# Patient Record
Sex: FEMALE | Race: WHITE | NOT HISPANIC OR LATINO | URBAN - METROPOLITAN AREA
[De-identification: names, ages, dates, MRNs, and addresses within clinical notes are randomized per-mention and may not be internally consistent; named-entity substitution may affect disease eponyms.]

---

## 2023-05-17 RX ORDER — SODIUM CHLORIDE 9 MG/ML
1000 INJECTION, SOLUTION INTRAVENOUS
Refills: 0 | Status: DISCONTINUED | OUTPATIENT
Start: 2023-05-18 | End: 2023-05-18

## 2023-05-17 NOTE — ASU PATIENT PROFILE, ADULT - NSICDXPASTSURGICALHX_GEN_ALL_CORE_FT
PAST SURGICAL HISTORY:  H/O: hysterectomy     History of surgery left shoulder x4 due to accident

## 2023-05-17 NOTE — ASU PATIENT PROFILE, ADULT - FALL HARM RISK - RISK INTERVENTIONS

## 2023-05-18 ENCOUNTER — OUTPATIENT (OUTPATIENT)
Dept: OUTPATIENT SERVICES | Facility: HOSPITAL | Age: 68
LOS: 1 days | Discharge: HOME | End: 2023-05-18

## 2023-05-18 VITALS
DIASTOLIC BLOOD PRESSURE: 70 MMHG | RESPIRATION RATE: 16 BRPM | HEART RATE: 50 BPM | OXYGEN SATURATION: 97 % | SYSTOLIC BLOOD PRESSURE: 119 MMHG | TEMPERATURE: 98 F

## 2023-05-18 VITALS
RESPIRATION RATE: 14 BRPM | HEIGHT: 69 IN | DIASTOLIC BLOOD PRESSURE: 79 MMHG | HEART RATE: 63 BPM | OXYGEN SATURATION: 98 % | SYSTOLIC BLOOD PRESSURE: 127 MMHG | WEIGHT: 165.79 LBS | TEMPERATURE: 98 F

## 2023-05-18 DIAGNOSIS — Z90.710 ACQUIRED ABSENCE OF BOTH CERVIX AND UTERUS: Chronic | ICD-10-CM

## 2023-05-18 DIAGNOSIS — Z98.890 OTHER SPECIFIED POSTPROCEDURAL STATES: Chronic | ICD-10-CM

## 2023-05-18 DEVICE — IMPLANTABLE DEVICE
Type: IMPLANTABLE DEVICE | Site: RIGHT | Status: NON-FUNCTIONAL
Removed: 2023-05-18

## 2023-05-18 RX ORDER — CYCLOPENTOLATE HYDROCHLORIDE 10 MG/ML
1 SOLUTION/ DROPS OPHTHALMIC
Refills: 0 | Status: COMPLETED | OUTPATIENT
Start: 2023-05-18 | End: 2023-05-18

## 2023-05-18 RX ORDER — FENTANYL CITRATE 50 UG/ML
25 INJECTION INTRAVENOUS
Refills: 0 | Status: DISCONTINUED | OUTPATIENT
Start: 2023-05-18 | End: 2023-05-18

## 2023-05-18 RX ORDER — OFLOXACIN 0.3 %
1 DROPS OPHTHALMIC (EYE)
Refills: 0 | Status: COMPLETED | OUTPATIENT
Start: 2023-05-18 | End: 2023-05-18

## 2023-05-18 RX ORDER — TROPICAMIDE 1 %
1 DROPS OPHTHALMIC (EYE)
Refills: 0 | Status: COMPLETED | OUTPATIENT
Start: 2023-05-18 | End: 2023-05-18

## 2023-05-18 RX ORDER — KETOROLAC TROMETHAMINE 0.5 %
1 DROPS OPHTHALMIC (EYE)
Refills: 0 | Status: COMPLETED | OUTPATIENT
Start: 2023-05-18 | End: 2023-05-18

## 2023-05-18 RX ORDER — PHENYLEPHRINE HCL 2.5 %
1 DROPS OPHTHALMIC (EYE)
Refills: 0 | Status: COMPLETED | OUTPATIENT
Start: 2023-05-18 | End: 2023-05-18

## 2023-05-18 RX ORDER — SODIUM CHLORIDE 9 MG/ML
500 INJECTION, SOLUTION INTRAVENOUS
Refills: 0 | Status: DISCONTINUED | OUTPATIENT
Start: 2023-05-18 | End: 2023-05-18

## 2023-05-18 RX ADMIN — Medication 1 DROP(S): at 07:20

## 2023-05-18 RX ADMIN — CYCLOPENTOLATE HYDROCHLORIDE 1 DROP(S): 10 SOLUTION/ DROPS OPHTHALMIC at 07:25

## 2023-05-18 RX ADMIN — Medication 1 DROP(S): at 07:15

## 2023-05-18 RX ADMIN — CYCLOPENTOLATE HYDROCHLORIDE 1 DROP(S): 10 SOLUTION/ DROPS OPHTHALMIC at 07:15

## 2023-05-18 RX ADMIN — Medication 1 DROP(S): at 07:25

## 2023-05-18 RX ADMIN — CYCLOPENTOLATE HYDROCHLORIDE 1 DROP(S): 10 SOLUTION/ DROPS OPHTHALMIC at 07:20

## 2023-05-19 NOTE — OPERATIVE REPORT - OPERATIVE RPOSRT DETAILS
PROCEDURE DATE: 05-18-23    OPERATION:  Phacoemulsification with lens implant, RIGHT eye, plus femtolaser plus ORA, RIGHT eye.    PREOPERATIVE DIAGNOSES:  Nuclear sclerotic cataract, astigmatism- right eye    POSTOPERATIVE DIAGNOSES:  Nuclear sclerotic cataract, astigmatism - right eye    PROCEDURE:  After appropriate medical clearance and informed consent was obtained, the patient was brought into the operating room in stable condition.  Prior to placing the patient at the Femto laser, topical anesthetic was placed into the right eye and while the patient was sitting up, a marking pen was used to measure the 3, 6, 9 and 12 o'clock positions of the eye.  The patient was in the prone position for Femto laser.  Suction cup was placed on the eye and docked to the laser.  Capsulorhexis and nuclear fragmentation was performed.  Suction was removed and the Femto procedure was finished.  After this, the patient was brought into the operating room where MAC anesthesia was induced and the patient was prepped and draped in the usual manner for sterile ophthalmic surgery.  A Padmini speculum was placed into the eye and stabilized with two 4x4s.  Topical lidocaine 4% was instilled over the cornea.  The surgeon sat temporally.  At this point, the Toric lens desired axis of positioning was then marked using a marking pen.  A paracentesis was then made and intraocular lidocaine and viscoelastic was inserted into the eye.  Then a biplanar incision was used with a 2.4 mm keratome to make an incision at approximately 3 o'clock to the left eye and approximately 9 o'clock for the right eye.  The anterior capsule was removed and hydrodissection and hydrodelineation of the lens was then performed using BSS on a flat-tipped cannula.  Phacoemulsification of the nucleus was then performed by sculpting the nucleus into half and removing each quadrant after visco was injected.  Phacoemulsifications of the two-halves was performed without complication.    Any remaining cortical material was removed using irrigation and aspiration mechanical technique.  Both the anterior and posterior leaflets of the capsule were cleaned and Healon was inserted in the bag to inflate the anterior and posterior leaflets.  At this time, an ORA was performed to ensure that the desired axis and power of implant was correct.  The Toric lens was then inserted into the right eye and dilated into position at the correct axis.  A Barraquer tonometer was then used to determine that intraocular pressure of the eye was somewhere between 15 to 20 mmHg.  The ORA machine was then used to determine the correct intraocular lens implant, which was confirmed at 21.5 diopters for an [id number] lens.  The ORA machine was turned off and the Implant number [id number], 21.5 diopter was inserted into the bag and rotated into position.  Any remaining Healon was then removed with careful attention to ensure that there was no rotation of the implant and that it remained on axis to correct the patient's astigmatism.    Miochol was then inserted into the eye until good pressure was obtained.  Hydration of both the paracenteses in the wounds were made using BSS and after careful attention to ensuring that the wound closure was made.  At this point, the Padmini speculum was removed and a clear patch was placed over the eye.  The patient returned to the recovery room in stable and improved condition.

## 2023-05-20 PROBLEM — Z78.9 OTHER SPECIFIED HEALTH STATUS: Chronic | Status: ACTIVE | Noted: 2023-05-17

## 2023-05-31 RX ORDER — SODIUM CHLORIDE 9 MG/ML
1000 INJECTION, SOLUTION INTRAVENOUS
Refills: 0 | Status: DISCONTINUED | OUTPATIENT
Start: 2023-06-01 | End: 2023-06-01

## 2023-05-31 RX ORDER — PHENYLEPHRINE HCL 2.5 %
1 DROPS OPHTHALMIC (EYE)
Refills: 0 | Status: DISCONTINUED | OUTPATIENT
Start: 2023-06-01 | End: 2023-06-01

## 2023-05-31 NOTE — ASU PATIENT PROFILE, ADULT - NSICDXPASTSURGICALHX_GEN_ALL_CORE_FT
PAST SURGICAL HISTORY:  H/O: hysterectomy     History of surgery left shoulder x4 due to accident    S/P cataract surgery right

## 2023-05-31 NOTE — ASU PATIENT PROFILE, ADULT - PRO INTERPRETER NEED 2
I reviewed the key components of the Medicare wellness questionnaire with the patient in detail today. He is overdue for eye examination, which she is aware of, however right now he does not have the finances to do this. I have told him to try to get this done as soon as possible. Also reviewed preventative issues. He defers colonoscopy. He is given the stool card. He is reminded of the importance of getting this completed in the near future. I have also told the patient that given his family history (sister with colorectal cancer who  in her 40s) and I would prefer that he have a colonoscopy. Due to the patient's relative immobility, difficulty doing the prep, difficulty getting to and from a colonoscopy still defers colonoscopy at this time.   English

## 2023-05-31 NOTE — ASU PATIENT PROFILE, ADULT - NS PREOP UNDERSTANDS INFO
No solid food/dairy/candy/gum after 9:30pm tonight, water allowed before 04:30, patient to come with photo ID/insurance card, no jewelries/contact lens/valuables, no smoking/alcohol/recreational drug use today, escort to have photo ID, address and call back number was provided./yes

## 2023-05-31 NOTE — ASU PATIENT PROFILE, ADULT - NS PRO ABUSE SCREEN SUSPICION NEGLECT YN
Hx of lupus nephritis on plaquenil  - c/w plaquenil Hx of lupus nephritis on plaquenil  - c/w plaquenil Hx of lupus nephritis on plaquenil  - c/w plaquenil Hx of lupus nephritis on plaquenil  - c/w plaquenil Hx of lupus nephritis on plaquenil  - c/w plaquenil no

## 2023-05-31 NOTE — ASU PATIENT PROFILE, ADULT - FALL HARM RISK - RISK INTERVENTIONS

## 2023-06-01 ENCOUNTER — OUTPATIENT (OUTPATIENT)
Dept: OUTPATIENT SERVICES | Facility: HOSPITAL | Age: 68
LOS: 1 days | Discharge: HOME | End: 2023-06-01

## 2023-06-01 VITALS
RESPIRATION RATE: 12 BRPM | DIASTOLIC BLOOD PRESSURE: 73 MMHG | OXYGEN SATURATION: 98 % | TEMPERATURE: 97 F | SYSTOLIC BLOOD PRESSURE: 110 MMHG | HEART RATE: 60 BPM

## 2023-06-01 VITALS
SYSTOLIC BLOOD PRESSURE: 114 MMHG | HEART RATE: 67 BPM | WEIGHT: 165.35 LBS | OXYGEN SATURATION: 98 % | HEIGHT: 69 IN | TEMPERATURE: 97 F | DIASTOLIC BLOOD PRESSURE: 71 MMHG | RESPIRATION RATE: 16 BRPM

## 2023-06-01 DIAGNOSIS — Z98.890 OTHER SPECIFIED POSTPROCEDURAL STATES: Chronic | ICD-10-CM

## 2023-06-01 DIAGNOSIS — Z90.710 ACQUIRED ABSENCE OF BOTH CERVIX AND UTERUS: Chronic | ICD-10-CM

## 2023-06-01 DIAGNOSIS — Z98.49 CATARACT EXTRACTION STATUS, UNSPECIFIED EYE: Chronic | ICD-10-CM

## 2023-06-01 DEVICE — IMPLANTABLE DEVICE
Type: IMPLANTABLE DEVICE | Status: NON-FUNCTIONAL
Removed: 2023-06-01

## 2023-06-01 RX ORDER — CYCLOPENTOLATE HYDROCHLORIDE 10 MG/ML
1 SOLUTION/ DROPS OPHTHALMIC
Refills: 0 | Status: COMPLETED | OUTPATIENT
Start: 2023-06-01 | End: 2023-06-01

## 2023-06-01 RX ORDER — OFLOXACIN 0.3 %
1 DROPS OPHTHALMIC (EYE)
Refills: 0 | Status: COMPLETED | OUTPATIENT
Start: 2023-06-01 | End: 2023-06-01

## 2023-06-01 RX ORDER — TROPICAMIDE 1 %
1 DROPS OPHTHALMIC (EYE)
Refills: 0 | Status: COMPLETED | OUTPATIENT
Start: 2023-06-01 | End: 2023-06-01

## 2023-06-01 RX ORDER — KETOROLAC TROMETHAMINE 0.5 %
1 DROPS OPHTHALMIC (EYE)
Refills: 0 | Status: COMPLETED | OUTPATIENT
Start: 2023-06-01 | End: 2023-06-01

## 2023-06-01 RX ADMIN — Medication 1 DROP(S): at 07:24

## 2023-06-01 RX ADMIN — CYCLOPENTOLATE HYDROCHLORIDE 1 DROP(S): 10 SOLUTION/ DROPS OPHTHALMIC at 07:11

## 2023-06-01 RX ADMIN — CYCLOPENTOLATE HYDROCHLORIDE 1 DROP(S): 10 SOLUTION/ DROPS OPHTHALMIC at 07:02

## 2023-06-01 RX ADMIN — Medication 1 DROP(S): at 07:23

## 2023-06-01 RX ADMIN — Medication 1 DROP(S): at 07:02

## 2023-06-01 RX ADMIN — Medication 1 DROP(S): at 07:10

## 2023-06-01 RX ADMIN — Medication 1 DROP(S): at 07:11

## 2023-06-01 RX ADMIN — CYCLOPENTOLATE HYDROCHLORIDE 1 DROP(S): 10 SOLUTION/ DROPS OPHTHALMIC at 07:24

## 2023-06-01 RX ADMIN — SODIUM CHLORIDE 40 MILLILITER(S): 9 INJECTION, SOLUTION INTRAVENOUS at 08:36

## 2023-06-01 RX ADMIN — Medication 1 DROP(S): at 07:01

## 2023-06-01 NOTE — PRE-ANESTHESIA EVALUATION ADULT - NSANTHTOBACCOSD_GEN_ALL_CORE
Ob nurse unable to establish IV. Lab called for blood draw fit foot stick.       Everet Kocher, RN  03/21/20 2035 No

## 2023-06-06 NOTE — OPERATIVE REPORT - OPERATIVE RPOSRT DETAILS
PROCEDURE DATE: 06-01-23    OPERATION:  Phacoemulsification with lens implant, LEFT eye, plus femtolaser plus ORA, LEFT eye.    PREOPERATIVE DIAGNOSES:  Nuclear sclerotic cataract, astigmatism- LEFT eye    POSTOPERATIVE DIAGNOSES:  Nuclear sclerotic cataract, astigmatism - LEFT eye    PROCEDURE:  After appropriate medical clearance and informed consent was obtained, the patient was brought into the operating room in stable condition.  Prior to placing the patient at the Femto laser, topical anesthetic was placed into the LEFT eye and while the patient was sitting up, a marking pen was used to measure the 3, 6, 9 and 12 o'clock positions of the eye.  The patient was in the prone position for Femto laser.  Suction cup was placed on the eye and docked to the laser.  Capsulorhexis and nuclear fragmentation was performed.  Suction was removed and the Femto procedure was finished.  After this, the patient was brought into the operating room where MAC anesthesia was induced and the patient was prepped and draped in the usual manner for sterile ophthalmic surgery.  A Padmini speculum was placed into the eye and stabilized with two 4x4s.  Topical lidocaine 4% was instilled over the cornea.  The surgeon sat temporally.  At this point, the Toric lens desired axis of positioning was then marked using a marking pen.  A paracentesis was then made and intraocular lidocaine and viscoelastic was inserted into the eye.  Then a biplanar incision was used with a 2.4 mm keratome to make an incision at approximately 3 o'clock to the left eye and approximately 9 o'clock for the right eye.  The anterior capsule was removed and hydrodissection and hydrodelineation of the lens was then performed using BSS on a flat-tipped cannula.  Phacoemulsification of the nucleus was then performed by sculpting the nucleus into half and removing each quadrant after visco was injected.  Phacoemulsifications of the two-halves was performed without complication.    Any remaining cortical material was removed using irrigation and aspiration mechanical technique.  Both the anterior and posterior leaflets of the capsule were cleaned and Healon was inserted in the bag to inflate the anterior and posterior leaflets.  At this time, an ORA was performed to ensure that the desired axis and power of implant was correct.  The Toric lens was then inserted into the LEFT eye and dilated into position at the correct axis.  A Barraquer tonometer was then used to determine that intraocular pressure of the eye was somewhere between 15 to 20 mmHg.  The ORA machine was then used to determine the correct intraocular lens implant, which was confirmed at 21.5 diopters for an [id number] lens.  The ORA machine was turned off and the Implant number [id number], 21.5 diopter was inserted into the bag and rotated into position.  Any remaining Healon was then removed with careful attention to ensure that there was no rotation of the implant and that it remained on axis to correct the patient's astigmatism.    Miochol was then inserted into the eye until good pressure was obtained.  Hydration of both the paracenteses in the wounds were made using BSS and after careful attention to ensuring that the wound closure was made.  At this point, the Padmini speculum was removed and a clear patch was placed over the eye.  The patient returned to the recovery room in stable and improved condition.

## 2023-07-17 NOTE — ASU PREOP CHECKLIST - WEIGHT IN KG
75.2 [Feeling Poorly] : not feeling poorly (malaise) [Fever] : no fever [Wgt Loss (___ Lbs)] : no recent weight loss [Pallor] : not pale [Eye Discharge] : no eye discharge [Redness] : no redness [Change in Vision] : no change in vision [Nasal Stuffiness] : no nasal congestion [Sore Throat] : no sore throat [Earache] : no earache [Loss Of Hearing] : no hearing loss [Cyanosis] : no cyanosis [Edema] : no edema [Diaphoresis] : not diaphoretic [Chest Pain] : no chest pain or discomfort [Exercise Intolerance] : no persistence of exercise intolerance [Palpitations] : no palpitations [Orthopnea] : no orthopnea [Fast HR] : no tachycardia [Tachypnea] : not tachypneic [Wheezing] : no wheezing [Cough] : no cough [Shortness Of Breath] : not expressed as feeling short of breath [Vomiting] : no vomiting [Diarrhea] : no diarrhea [Abdominal Pain] : no abdominal pain [Decrease In Appetite] : appetite not decreased [Fainting (Syncope)] : no fainting [Seizure] : no seizures [Headache] : no headache [Dizziness] : no dizziness [Limping] : no limping [Joint Pains] : no arthralgias [Joint Swelling] : no joint swelling [Rash] : no rash [Wound problems] : no wound problems [Easy Bruising] : no tendency for easy bruising [Swollen Glands] : no lymphadenopathy [Easy Bleeding] : no ~M tendency for easy bleeding [Nosebleeds] : no epistaxis [Sleep Disturbances] : ~T no sleep disturbances [Hyperactive] : no hyperactive behavior [Depression] : no depression [Anxiety] : no anxiety [Failure To Thrive] : no failure to thrive [Short Stature] : short stature was not noted [Jitteriness] : no jitteriness [Heat/Cold Intolerance] : no temperature intolerance [Dec Urine Output] : no oliguria

## (undated) DEVICE — APPLICATOR COTTON TIP 6"

## (undated) DEVICE — SOL IRR BAL SALT 500ML

## (undated) DEVICE — PACK CENTURION 2.4MM

## (undated) DEVICE — TRANSFORMER INTREPID I/A 0.3MM

## (undated) DEVICE — KNIFE ALCON SLIT INTREPID CLEAR-CUT SAFETY 2.4MM

## (undated) DEVICE — PACK CENTURION FMS ACT.9 30 BAL

## (undated) DEVICE — Device

## (undated) DEVICE — GLV 6.5 PROTEXIS (WHITE)

## (undated) DEVICE — SYR LUER LOK 1CC

## (undated) DEVICE — DRAPE MICROSCOPE KNOB COVER SMALL (2 PCS)

## (undated) DEVICE — MARKING PEN W RULER

## (undated) DEVICE — KNIFE ALCON STANDARD FULL HANDLE 15 DEG (PINK)

## (undated) DEVICE — NUCLEUS HYDRODISSECTOR PEARCE ANGLED 25G X 22MM